# Patient Record
Sex: FEMALE | Race: WHITE
[De-identification: names, ages, dates, MRNs, and addresses within clinical notes are randomized per-mention and may not be internally consistent; named-entity substitution may affect disease eponyms.]

---

## 2017-11-15 ENCOUNTER — HOSPITAL ENCOUNTER (EMERGENCY)
Dept: HOSPITAL 60 - LB.ED | Age: 37
Discharge: HOME | End: 2017-11-15
Payer: MEDICAID

## 2017-11-15 DIAGNOSIS — Z91.040: ICD-10-CM

## 2017-11-15 DIAGNOSIS — Z79.899: ICD-10-CM
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
## 2017-11-15 DIAGNOSIS — Z88.5: ICD-10-CM

## 2017-11-15 DIAGNOSIS — M54.6: Primary | ICD-10-CM
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
## 2017-11-15 DIAGNOSIS — M54.5: ICD-10-CM

## 2017-11-15 PROCEDURE — 99283 EMERGENCY DEPT VISIT LOW MDM: CPT

## 2017-11-15 PROCEDURE — 84443 ASSAY THYROID STIM HORMONE: CPT

## 2017-11-15 PROCEDURE — 85025 COMPLETE CBC W/AUTO DIFF WBC: CPT

## 2017-11-15 PROCEDURE — 99282 EMERGENCY DEPT VISIT SF MDM: CPT

## 2017-11-15 PROCEDURE — 80053 COMPREHEN METABOLIC PANEL: CPT

## 2017-11-15 PROCEDURE — 36415 COLL VENOUS BLD VENIPUNCTURE: CPT

## 2017-11-15 NOTE — EDM.PDOC
ED HPI GENERAL MEDICAL PROBLEM





- General


Chief Complaint: General


Stated Complaint: back pain


Time Seen by Provider: 11/15/17 19:25


Source of Information: Reports: Patient, Family, RN


History Limitations: Reports: No Limitations





- History of Present Illness


INITIAL COMMENTS - FREE TEXT/NARRATIVE: 


37 yr female presents with upper back pain and numbness.  States she takes 

medication for low back pain.  Pt sitting with HOB elevated in no acute 

distress.  States she fell and hurt her upper back at home.





Onset: Today


  ** lower back


Pain Score (Numeric/FACES): 8





- Related Data


 Allergies











Allergy/AdvReac Type Severity Reaction Status Date / Time


 


latex Allergy  Itching Verified 11/15/17 19:35


 


morphine Allergy  Nausea and Verified 11/15/17 19:35





   Vomiting  











Home Meds: 


 Home Meds





Albuterol [Proventil HFA] 200 puff INH Q2H 11/15/17 [History]


Baclofen 10 mg PO TID 11/15/17 [History]


Cyanocobalamin (Vitamin B-12) [Vitamin B-12] 1,000 mcg PO DAILY 11/15/17 [

History]


DULoxetine [Cymbalta] 30 mg PO ACBREAKFAST 11/15/17 [History]


DULoxetine [Cymbalta] 60 mg PO BEDTIME 11/15/17 [History]


Ferrous Sulfate [Iron] 325 mg PO DAILY 11/15/17 [History]


Gabapentin [Neurontin] 900 mg PO QID 11/15/17 [History]


Mv-Min/Iron/Folic/Calcium/Vitk [Women's Daily Formula Tablet] 1 each PO DAILY 11

/15/17 [History]


hydrOXYzine HCl [Vistaril] 50 mg PO DAILY 11/15/17 [History]


oxyCODONE 5 mg PO ASDIRECTED PRN 11/15/17 [History]











ED ROS GENERAL





- Review of Systems


Review Of Systems: See Below


Constitutional: Reports: Weakness


HEENT: Reports: No Symptoms


Respiratory: Reports: No Symptoms


Cardiovascular: Reports: No Symptoms


GI/Abdominal: Reports: No Symptoms, Other (BM this am, hx gastric bypass)


: Reports: Other (hx hysterectomy)


Musculoskeletal: Reports: Back Pain, Leg Pain


Skin: Reports: No Symptoms


Neurological: Reports: Dizziness, Weakness





ED EXAM, GENERAL





- Physical Exam


Exam: See Below


Exam Limited By: No Limitations


General Appearance: Alert, No Apparent Distress


Nose: Normal Inspection


Throat/Mouth: Normal Lips


Head: Atraumatic, Normocephalic


Neck: Supple, Non-Tender


Respiratory/Chest: No Respiratory Distress, Lungs Clear


Cardiovascular: Regular Rate, Rhythm, No Edema


GI/Abdominal: Normal Bowel Sounds, Soft, Non-Tender


Back Exam: Paraspinal Tenderness


Extremities: Normal Inspection, No Pedal Edema


Neurological: Alert, Oriented


Psychiatric: Normal Affect, Normal Mood


Skin Exam: Warm, Dry, Normal Color


Lymphatic: No Adenopathy





Course





- Vital Signs


Last Recorded V/S: 


 Last Vital Signs











Temp  98.2 F   11/15/17 19:36


 


Pulse  93   11/15/17 19:36


 


Resp  20   11/15/17 19:36


 


BP  97/56 L  11/15/17 19:36


 


Pulse Ox  100   11/15/17 19:36














- Orders/Labs/Meds


Labs: 


 Laboratory Tests











  11/15/17 11/15/17 11/15/17 Range/Units





  19:35 19:35 19:35 


 


WBC   7.9  D   (4.0-11.0)  K/uL


 


RBC   3.92   (3.80-5.80)  M/uL


 


Hgb   8.9 L   (11.5-16.5)  g/dL


 


Hct   29.2 L   (37.0-47.0)  %


 


MCV   75 L   (76-96)  fL


 


MCH   22.7 L   (27.0-32.0)  pg


 


MCHC   30.5 L   (31.0-35.0)  g/dL


 


RDW   17.7 H   (11.0-16.0)  %


 


Plt Count   429   (150-500)  K/uL


 


MPV   8.9   (6.0-10.0)  fL


 


Neut % (Auto)   55.4   (45.0-70.0)  %


 


Lymph % (Auto)   33.4   (20.0-40.0)  %


 


Mono % (Auto)   8.6   (3.0-10.0)  %


 


Eos % (Auto)   2.3   (1.0-5.0)  %


 


Baso % (Auto)   0.3   (0.0-0.5)  %


 


Neut # (Auto)   4.38   (2.00-7.50)  K/uL


 


Lymph # (Auto)   2.64   (1.50-4.00)  K/uL


 


Mono # (Auto)   0.68   (0.20-0.80)  K/uL


 


Eos # (Auto)   0.18   (0.04-0.40)  K/uL


 


Baso # (Auto)   0.02   (0.02-0.10)  K/uL


 


Sodium    144  (136-145)  mmol/L


 


Potassium    3.9  (3.5-5.1)  mmol/L


 


Chloride    109 H  ()  mmol/L


 


Carbon Dioxide    25.6  (21.0-32.0)  mmol/L


 


Anion Gap    13.3  (5.0-15.0)  mmol/L


 


BUN    17  D  (8-26)  mg/dL


 


Creatinine    0.57  (0.55-1.02)  mg/dL


 


Est Cr Clr Drug Dosing    119.24  mL/min


 


Estimated GFR (MDRD)    > 60  (>60)  MLS/MIN


 


BUN/Creatinine Ratio    29.8 H  (6-25)  


 


Glucose    104 H  ()  mg/dL


 


Calcium    8.5  (8.5-10.1)  mg/dL


 


Total Bilirubin    0.1  D  (0.0-1.0)  mg/dL


 


AST    24  (15-37)  U/L


 


ALT    25  (12-78)  U/L


 


Alkaline Phosphatase    93  ()  U/L


 


Total Protein    6.8  (6.4-8.2)  g/dL


 


Albumin    3.4  (3.4-5.0)  g/dL


 


Globulin    3.4  (2.2-4.2)  g/dL


 


Albumin/Globulin Ratio    1.0  (0.8-2.0)  


 


TSH, Ultra Sensitive  0.548  D    (0.358-3.740)  uIU/mL














- Re-Assessments/Exams


Free Text/Narrative Re-Assessment/Exam: 





11/15/17 20:22  Review of lab results.  low hgb noted and reviewed with pt.  

Recommend rest, hydration, change position slowly, be aware of dizziness, use 

heat/ice to back for comfort.  Will give Toradol 30 mg IM for relief of pain.  

Pt to follow-up with PCP tomorrow for pain.  She has met with a neurosurgeon 

and is expecting spinal fusion.  She does have iron, B12, and folic acid and 

multi-vitamin at home.  Continue with these to build up blood before surgery.  





11/15/17 20:28  Results of MRI noted and reviewed with pt.  Degenerative disc 

disease noted.  D.ischarge to home, ambulatory








Departure





- Departure


Time of Disposition: 20:29


Disposition: Home, Self-Care 01


Condition: Good


Clinical Impression: 


 Pain








- Discharge Information


Referrals: 


PCP,None [Primary Care Provider] - 


Forms:  ED Department Discharge

## 2017-11-17 ENCOUNTER — HOSPITAL ENCOUNTER (EMERGENCY)
Dept: HOSPITAL 60 - LB.ED | Age: 37
Discharge: HOME | End: 2017-11-17
Payer: MEDICAID

## 2017-11-17 DIAGNOSIS — Z88.5: ICD-10-CM

## 2017-11-17 DIAGNOSIS — M47.816: Primary | ICD-10-CM

## 2017-11-17 DIAGNOSIS — Z88.8: ICD-10-CM

## 2017-11-17 DIAGNOSIS — Z79.899: ICD-10-CM

## 2017-11-17 PROCEDURE — 96374 THER/PROPH/DIAG INJ IV PUSH: CPT

## 2017-11-17 PROCEDURE — 96375 TX/PRO/DX INJ NEW DRUG ADDON: CPT

## 2017-11-17 PROCEDURE — 99283 EMERGENCY DEPT VISIT LOW MDM: CPT

## 2017-11-17 NOTE — EDM.PDOC
ED HPI GENERAL MEDICAL PROBLEM





- General


Chief Complaint: General


Stated Complaint: BACK PAIN


Time Seen by Provider: 17 19:20


Source of Information: Reports: Patient


History Limitations: Reports: No Limitations





- History of Present Illness


INITIAL COMMENTS - FREE TEXT/NARRATIVE: 


According to patient she claims that she has been having low back pain for past 

1 wk now. Pain is all over her lower back . She claims that she is numb from 

her waist all the way down to the right foot. She claims this has been going on 

for 1 wk. She is from around [place] and her PCP and her neuro surgeon are in 

[place]. She claims that she had incontinence of urine few weeks ago and she is 

not sure why., but now feels fine.She did get MRI done on 17 and was seen 

in the emergency room here for the same symptoms. She was given toradol and 

that i not helping. Her primary care provider has her on oxycodone 5mg 3-4 

times daily and it is not helping her.





She has a copy of her  Lumbar MRI and claims her MRI has been seen by 3 

neurosurgeons in [place] and they all have recommended surgery for her.





Duration: Week(s): (1wk )


Treatments PTA: Reports: Acetaminophen





- Related Data


 Allergies











Allergy/AdvReac Type Severity Reaction Status Date / Time


 


diphenhydramine Allergy  Seizure Verified 17 19:55





[From Benadryl]     


 


latex Allergy  Itching Verified 17 19:54


 


morphine Allergy  Nausea and Verified 17 19:54





   Vomiting  











Home Meds: 


 Home Meds





Albuterol [Proventil HFA] 200 puff INH Q2H 11/15/17 [History]


Baclofen 10 mg PO TID 11/15/17 [History]


Cyanocobalamin (Vitamin B-12) [Vitamin B-12] 1,000 mcg PO DAILY 11/15/17 [

History]


Ferrous Sulfate [Iron] 325 mg PO DAILY 11/15/17 [History]


Gabapentin [Neurontin] 900 mg PO QID 11/15/17 [History]


Mv-Min/Iron/Folic/Calcium/Vitk [Women's Daily Formula Tablet] 1 each PO DAILY 11

/15/17 [History]


hydrOXYzine HCl [Vistaril] 50 mg PO DAILY 11/15/17 [History]


oxyCODONE 5 mg PO ASDIRECTED PRN 11/15/17 [History]











Past Medical History


Respiratory History: Reports: Asthma


Gastrointestinal History: Reports: Bowel Obstruction


Musculoskeletal History: Reports: Back Pain, Chronic


Psychiatric History: Reports: Anxiety





- Past Surgical History


Respiratory Surgical History: Reports: None


GI Surgical History: Reports: Bariatric Procedure, Cholecystectomy


Female  Surgical History: Reports:  Section, Hysterectomy, 

Oophorectomy, Tubal Ligation


Musculoskeletal Surgical History: Reports: Other (See Below)


Other Musculoskeletal Surgeries/Procedures:: Screws in her (R) ankle and christi in 

her  (R) knee





ED ROS GENERAL





- Review of Systems


Review Of Systems: See Below


Constitutional: Denies: Fever, Chills


HEENT: Denies: Rhinitis, Sinus Problem, Throat Pain


Respiratory: Denies: Shortness of Breath, Wheezing, Cough, Sputum


Cardiovascular: Denies: Chest Pain, Lightheadedness


GI/Abdominal: Denies: Abdominal Pain, Nausea, Vomiting


: Denies: Dysuria, Flank Pain


Musculoskeletal: Reports: Back Pain.  Denies: Shoulder Pain, Joint Pain, Joint 

Swelling


Skin: Denies: Pruritis, Rash


Neurological: Denies: Confusion, Dizziness, Seizure, Difficulty Walking





ED EXAM, GENERAL





- Physical Exam


Exam: See Below


Exam Limited By: No Limitations


General Appearance: Alert, WD/WN, No Apparent Distress, Other (Pt is over-

reactive to pain)


Eye Exam: Bilateral Eye: EOMI, PERRL


Ears: Normal External Exam, Normal Canal, Hearing Grossly Normal, Normal TMs


Ear Exam: Bilateral Ear: Auricle Normal, Canal Normal, TM normal


Nose: Normal Inspection, Normal Mucosa, No Blood


Throat/Mouth: Normal Inspection, Normal Lips, Normal Teeth, Normal Gums, Normal 

Oropharynx, Normal Voice, No Airway Compromise


Head: Atraumatic, Normocephalic


Neck: Normal Inspection, Supple, Non-Tender, Full Range of Motion


Respiratory/Chest: No Respiratory Distress, Lungs Clear, Normal Breath Sounds, 

No Accessory Muscle Use, Chest Non-Tender


Cardiovascular: Normal Peripheral Pulses, Regular Rate, Rhythm, No Edema, No 

Gallop, No JVD, No Murmur, No Rub


Back Exam: Normal Inspection, Full Range of Motion, Paraspinal Tenderness (all 

over the lumbar region, exaggerated pain response even to touch.), Vertebral 

Tenderness (tender all over spinous).  No: CVA Tenderness (R), CVA Tenderness (L

)





Course





- Vital Signs


Text/Narrative:: 


Pt's MRi of the lumbar spine degenerative disc disease. there is neuronal 

foraminal  stenosis at different level. there  is no nerve entrapment or 

compression seen. But her neurosurgeon has told her that she has nerve 

compression( which might be true, per her neuro surgeon). On clinical exam she 

is able to stand up and walk. she has good strength in her great toe flexion 

and extension. hard to evaluate reflexes due to tense feeling of the lower 

extremity muscles. Pt claims she is on oxycodone and it is not helping.





She receive Dilaudid 1mgg and toradol 30mg IM. I have advised patient to 

alternate her oxycodone with tylenol, 650mg every 4 hrs. If she lorenzo develop 

difficulty walking, saddle numbness or incontinence of urine or stool needs to 

come back to emergency room.





Other wise advised to followup with her primary care physician and her 

neurosurgeon on monday for futher care of her bak pain.





Pt understand and agrees to the plan.





Last Recorded V/S: 





 Last Vital Signs











Temp  98.1 F   17 19:50


 


Pulse  97   17 19:50


 


Resp  12   17 19:50


 


BP  122/71   17 19:50


 


Pulse Ox  100   17 19:50














- Orders/Labs/Meds


Orders: 





 Active Orders 24 hr











 Category Date Time Status


 


 HYDROmorphone [Dilaudid] Med  17 20:14 Active





 1 mg IVPUSH Q2H PRN   








 Medication Orders





Hydromorphone HCl (Dilaudid)  1 mg IVPUSH Q2H PRN


   PRN Reason: PAIN


   Last Admin: 17 20:23  Dose: 1 mg








Meds: 





Medications











Generic Name Dose Route Start Last Admin





  Trade Name Freq  PRN Reason Stop Dose Admin


 


Hydromorphone HCl  1 mg  17 20:14  17 20:23





  Dilaudid  IVPUSH   1 mg





  Q2H PRN   Administration





  PAIN   














Discontinued Medications














Generic Name Dose Route Start Last Admin





  Trade Name Freq  PRN Reason Stop Dose Admin


 


Ketorolac Tromethamine  30 mg  17 20:15  17 20:23





  Toradol  IVPUSH  17 20:16  30 mg





  ONETIME ONE   Administration














Departure





- Departure


Time of Disposition: 19:30


Disposition: Home, Self-Care 01


Condition: Fair


Clinical Impression: 


 Degenerative arthritis of lumbar spine








- Discharge Information


Instructions:  Back Pain, Adult


Forms:  ED Department Discharge


Additional Instructions: 


Follow up with your primary care provider and neurosurgeon on Monday. Alternate 

Tylenol 650 mg and oxycodone every 4 hours as needed for pain. Use intermittent 

heat to the back.  If you being to have incontinence of stool or urine return 

to the emergency room immediately. 





- Problem List & Annotations


(1) Degenerative arthritis of lumbar spine


Status: Acute   Current Visit: Yes   





- Problem List Review


Problem List Initiated/Reviewed/Updated: Yes





- My Orders


Last 24 Hours: 





My Active Orders





17 20:14


HYDROmorphone [Dilaudid]   1 mg IVPUSH Q2H PRN 














- Assessment/Plan


Last 24 Hours: 





My Active Orders





17 20:14


HYDROmorphone [Dilaudid]   1 mg IVPUSH Q2H PRN 











Assessment:: 


Lumbar DSD





Plan: 


Pt's MRi of the lumbar spine degenerative disc disease. there is neuronal 

foraminal  stenosis at different level. there  is no nerve entrapment or 

compression seen. But her neurosurgeon has told her that she has nerve 

compression( which might be true, per her neuro surgeon). On clinical exam she 

is able to stand up and walk. she has good strength in her great toe flexion 

and extension. hard to evaluate reflexes due to tense feeling of the lower 

extremity muscles. Pt claims she is on oxycodone and it is not helping.





She receive Dilaudid 1mgg and toradol 30mg IM. I have advised patient to 

alternate her oxycodone with tylenol, 650mg every 4 hrs. If she lorenzo develop 

difficulty walking, saddle numbness or incontinence of urine or stool needs to 

come back to emergency room.





Other wise advised to followup with her primary care physician and her 

neurosurgeon on monday for futher care of her bak pain.





Pt understand and agrees to the plan.

## 2017-12-29 ENCOUNTER — HOSPITAL ENCOUNTER (EMERGENCY)
Dept: HOSPITAL 60 - LB.ED | Age: 37
Discharge: HOME | End: 2017-12-29
Payer: MEDICAID

## 2017-12-29 DIAGNOSIS — R07.81: Primary | ICD-10-CM

## 2017-12-29 DIAGNOSIS — D64.9: ICD-10-CM

## 2017-12-29 DIAGNOSIS — Z98.84: ICD-10-CM

## 2017-12-29 PROCEDURE — 99285 EMERGENCY DEPT VISIT HI MDM: CPT

## 2017-12-29 PROCEDURE — 80053 COMPREHEN METABOLIC PANEL: CPT

## 2017-12-29 PROCEDURE — 93005 ELECTROCARDIOGRAM TRACING: CPT

## 2017-12-29 PROCEDURE — 82150 ASSAY OF AMYLASE: CPT

## 2017-12-29 PROCEDURE — 85025 COMPLETE CBC W/AUTO DIFF WBC: CPT

## 2017-12-29 PROCEDURE — 84484 ASSAY OF TROPONIN QUANT: CPT

## 2017-12-29 PROCEDURE — 83690 ASSAY OF LIPASE: CPT

## 2017-12-29 PROCEDURE — 96374 THER/PROPH/DIAG INJ IV PUSH: CPT

## 2017-12-29 PROCEDURE — 71260 CT THORAX DX C+: CPT

## 2017-12-29 NOTE — ER
HISTORY OF PRESENT ILLNESS:  37-year-old female here with her  with complaints of

chest pain from the midsternal area that seems to radiate back between the shoulder blades.

The patient states it started yesterday.  It seems to be slowly getting worse.  She has not

been coughing.  She denies any falls or injuries.  She has not been sick.  She rates the

pain at 7 or 8/10.  She states it feels the best when she sits up and holds her knees

against her chest with her arms.  The patient does have history of fibromyalgia.  She is

also status post gastric bypass and has had some issues with being anemic.

 

OBJECTIVE:  GENERAL APPEARANCE:  The patient is awake and alert, in no obvious distress.

VITAL SIGNS:  Reviewed.  They are normal.  HEENT:  Oral mucous membranes are moist.  Tonsils

not enlarged or injected.  Pharynx not inflamed.  NECK:  Supple.  LUNGS:  Clear.  Deep

breathing does cause some mild chest discomfort, both anterior and posteriorly.  I can

reproduce pain pushing on the posterior chest wall between the shoulder blades as well.

CARDIAC:  Heart sounds distinct.  S1, S2 present.  Regular rate.  No murmurs.  SKIN:  Warm

and dry.

 

LABORATORY DATA AND X-RAY:  CBC shows a hemoglobin of 8.5, which is just slightly less than

her most recent hemoglobin.  The patient states she has not been doing very good at taking

her supplemental iron tablet.  CMP is unremarkable.  Troponin is normal.  EKG is obtained

and is normal.  Chest CT is negative.

 

DIAGNOSES:

1. Pleuritic chest pain.

2. Secondary diagnosis, anemia, which I believe is chronic in nature.

 

TREATMENT PLAN:  The patient was given Dilaudid 1 mg IV here in the emergency room and I

brought her pain down to about a 4/10.  She is comfortable.  We will give her a few Norco

tablets to take as needed.  I do want the patient to follow up in the clinic early next week

for recheck.  She states she really does not have a primary care provider.  I advised

patient to establish care with a primary care provider.  She does see a neurologist in

[place], Dr. Lopez for other issues and I want the patient to increase her ferrous

sulfate to 2 or 3 times a day at this point.  The patient has no further questions.

 

 

CRS/MODL

DD:  12/29/2017 08:57:51

DT:  12/29/2017 09:19:21

Job #:  [id number]

## 2018-01-01 NOTE — CT
DATE OF SERVICE:  12/29/2017  

CLINICAL DATA:  Chest pain.



ENHANCED CHEST CT:



Multislice acquisition through the chest with IV contrast was performed.  



No priors.  



No evidence of PE.  No pneumothorax.  No pleural effusions.  No aortic aneurysm 
or dissection.  



The lungs are clear.  



The heart size is normal.  No significant pericardial effusion.  No hilar or 
mediastinal adenopathy.  



The patient is status post prior cholecystectomy.  There are also surgical 
changes involving the stomach.  



IMPRESSION:  No acute abnormalities.  



[id number]
[facility]

## 2018-08-31 ENCOUNTER — HOSPITAL ENCOUNTER (EMERGENCY)
Dept: HOSPITAL 60 - LB.ED | Age: 38
Discharge: HOME | End: 2018-08-31
Payer: MEDICAID

## 2018-08-31 DIAGNOSIS — Z88.8: ICD-10-CM

## 2018-08-31 DIAGNOSIS — Z79.899: ICD-10-CM

## 2018-08-31 DIAGNOSIS — Z88.6: ICD-10-CM

## 2018-08-31 DIAGNOSIS — E16.2: Primary | ICD-10-CM

## 2018-08-31 DIAGNOSIS — Z88.5: ICD-10-CM

## 2018-08-31 DIAGNOSIS — D50.8: ICD-10-CM

## 2018-08-31 DIAGNOSIS — Z91.040: ICD-10-CM

## 2018-08-31 NOTE — CT
DATE OF SERVICE:  08/31/18

CLINICAL DATA:  syncope



UNENHANCED BRAIN CT:



Multislice acquisition through the brain without IV contrast was performed. 



Comparison is made to a prior exam dated 10/31/17.



No masses or mass effect.  No intracranial hemorrhage.  No evidence of acute or 
subdural infarct.  



No changes from the prior study. 



IMPRESSION:  No acute intracranial abnormalities. 



[id number]
[facility]

## 2018-08-31 NOTE — EDM.PDOC
ED HPI GENERAL MEDICAL PROBLEM





- General


Stated Complaint: PASSED OUT


Time Seen by Provider: 18 12:55


Source of Information: Reports: Patient


History Limitations: Reports: No Limitations





- History of Present Illness


INITIAL COMMENTS - FREE TEXT/NARRATIVE: 





This is a 37yo who passed out today about 1045 for 15-20s per the significant 

other. She has passed out in the past with prior diagnoses of anemia and 

hypoglycemia. She has had prior workup and does see a neurologist. Patient 

denies current complaints but does feel nauseated. She states she has felt this 

way since yesterday morning. She has been working a lot more and had breakfast 

at 11 am after her episode and did not have anything to eat since last night.  


Onset: Sudden


Duration: Resolved Prior to Arrival


Location: Reports: Generalized





- Related Data


 Allergies











Allergy/AdvReac Type Severity Reaction Status Date / Time


 


diphenhydramine Allergy  Seizure Verified 18 14:03





[From Benadryl]     


 


ibuprofen Allergy  Vomiting Verified 18 14:03


 


latex Allergy  Itching Verified 18 14:03


 


morphine Allergy  Nausea and Verified 18 14:03





   Vomiting  











Home Meds: 


 Home Meds





Albuterol [Proventil HFA] 200 puff INH Q2H PRN 11/15/17 [History]


Baclofen 10 mg PO TID 11/15/17 [History]


Cyanocobalamin (Vitamin B-12) [Vitamin B-12] 1,000 mcg PO DAILY 11/15/17 [

History]


Ferrous Sulfate [Iron] 325 mg PO DAILY 11/15/17 [History]


Gabapentin [Neurontin] 900 mg PO QID 11/15/17 [History]


Mv-Min/Iron/Folic/Calcium/Vitk [Women's Daily Formula Tablet] 1 each PO DAILY 11

/15/17 [History]


hydrOXYzine HCl [Vistaril] 50 mg PO DAILY 11/15/17 [History]











Past Medical History


Respiratory History: Reports: Asthma


Gastrointestinal History: Reports: Bowel Obstruction


OB/GYN History: Reports: Endometriosis, Pregnancy


Musculoskeletal History: Reports: Back Pain, Chronic


Psychiatric History: Reports: Anxiety


Hematologic History: Reports: Anemia, B12 Deficiency, Blood Transfusion(s)





- Past Surgical History


Respiratory Surgical History: Reports: None


GI Surgical History: Reports: Bariatric Procedure, Cholecystectomy, Other (See 

Below)


Other GI Surgeries/Procedures: surgery to fix bowel obstruction


Female  Surgical History: Reports:  Section, Hysterectomy, 

Oophorectomy, Tubal Ligation


Musculoskeletal Surgical History: Reports: Other (See Below)


Other Musculoskeletal Surgeries/Procedures:: Screws in her (R) ankle and christi in 

her  (R) knee





ED ROS GENERAL





- Review of Systems


Review Of Systems: ROS reveals no pertinent complaints other than HPI.





- Physical Exam


Exam: See Below


Exam Limited By: No Limitations


General Appearance: Alert, WD/WN, No Apparent Distress


Eye Exam: Bilateral Eye: EOMI, PERRL


Ears: Normal External Exam


Nose: Normal Inspection


Throat/Mouth: Normal Inspection


Head Exam: Atraumatic, Normocephalic


Neck: Normal Inspection


Respiratory/Chest: No Respiratory Distress, Lungs Clear, Normal Breath Sounds


Cardiovascular: Normal Peripheral Pulses, Regular Rate, Rhythm


GI/Abdominal: Normal Bowel Sounds





Course





- Orders/Labs/Meds


Orders: 


 Active Orders 24 hr











 Category Date Time Status


 


 EKG Documentation Completion [RC] ASDIRECTED Care  18 13:14 Ordered


 


 Head wo Cont [CT] Stat Exams  18 13:14 Ordered











Labs: 


 Laboratory Tests











  18 Range/Units





  13:15 13:15 14:12 


 


WBC  6.9  D    (4.0-11.0)  K/uL


 


RBC  4.11    (3.80-5.80)  M/uL


 


Hgb  9.1 L    (11.5-16.5)  g/dL


 


Hct  30.3 L    (37.0-47.0)  %


 


MCV  74 L    (76-96)  fL


 


MCH  22.1 L    (27.0-32.0)  pg


 


MCHC  30.0 L    (31.0-35.0)  g/dL


 


RDW  18.9 H    (11.0-16.0)  %


 


Plt Count  341    (150-500)  K/uL


 


MPV  8.7    (6.0-10.0)  fL


 


Neut % (Auto)  62.2    (45.0-70.0)  %


 


Lymph % (Auto)  26.3    (20.0-40.0)  %


 


Mono % (Auto)  8.5    (3.0-10.0)  %


 


Eos % (Auto)  2.6    (1.0-5.0)  %


 


Baso % (Auto)  0.4    (0.0-0.5)  %


 


Neut # (Auto)  4.30    (2.00-7.50)  K/uL


 


Lymph # (Auto)  1.82    (1.50-4.00)  K/uL


 


Mono # (Auto)  0.59    (0.20-0.80)  K/uL


 


Eos # (Auto)  0.18    (0.04-0.40)  K/uL


 


Baso # (Auto)  0.03    (0.02-0.10)  K/uL


 


Sodium   140   (136-145)  mmol/L


 


Potassium   3.9   (3.5-5.1)  mmol/L


 


Chloride   109 H   ()  mmol/L


 


Carbon Dioxide   22.0   (21.0-32.0)  mmol/L


 


Anion Gap   12.9   (5.0-15.0)  mmol/L


 


BUN   10  D   (8-26)  mg/dL


 


Creatinine   0.63   (0.55-1.02)  mg/dL


 


Est Cr Clr Drug Dosing   TNP   


 


Estimated GFR (MDRD)   > 60   (>60)  MLS/MIN


 


BUN/Creatinine Ratio   15.9   (6-25)  


 


Glucose   91   ()  mg/dL


 


Calcium   7.7 L   (8.5-10.1)  mg/dL


 


Total Bilirubin   0.2   (0.0-1.0)  mg/dL


 


AST   24   (15-37)  U/L


 


ALT   32   (12-78)  U/L


 


Alkaline Phosphatase   89   ()  U/L


 


Troponin I   < 0.017   (0.000-0.060)  ng/mL


 


Total Protein   6.1 L   (6.4-8.2)  g/dL


 


Albumin   3.0 L   (3.4-5.0)  g/dL


 


Globulin   3.1   (2.2-4.2)  g/dL


 


Albumin/Globulin Ratio   1.0   (0.8-2.0)  


 


TSH, Ultra Sensitive   0.597   (0.358-3.740)  uIU/mL


 


Urine Color    Yellow  


 


Urine Appearance    Clear  (CLEAR)  


 


Urine pH    6.5  (5.0-8.0)  


 


Ur Specific Gravity    1.020  (1.003-1.030)  


 


Urine Protein    Negative  (NEGATIVE)  mg/dL


 


Urine Glucose (UA)    Negative  (NEGATIVE)  mg/dL


 


Urine Ketones    Negative  (NEGATIVE)  mg/dL


 


Urine Occult Blood    Negative  (NEGATIVE)  


 


Urine Nitrite    Negative  (NEGATIVE)  


 


Urine Bilirubin    Negative  (NEGATIVE)  


 


Urine Urobilinogen    0.2  (0.2-1.0)  E.U./dL


 


Ur Leukocyte Esterase    Negative  (NEGATIVE)  


 


Urine RBC    Not seen  /HPF


 


Urine WBC    Not seen  /HPF


 


Ur Squamous Epith Cells    Moderate  /HPF


 


Urine Bacteria    Rare  /HPF














Departure





- Departure


Time of Disposition: 14:30


Disposition: Home, Self-Care 01


Condition: Good


Clinical Impression: 


 Hypoglycemia





Anemia


Qualifiers:


 Anemia type: iron deficiency Iron deficiency anemia type: other iron 

deficiency Qualified Code(s): D50.8 - Other iron deficiency anemias








- Discharge Information


Instructions:  Anemia


Referrals: 


PCP,None [Primary Care Provider] - 


Forms:  ED Department Discharge


Additional Instructions: 


Wear holter monitor for 2 weeks then mail back in box it came in. Continue 

taking vitamins and iron as routinely done and ordered since gastric by pass 

surgery. Return to clinic or ER if any other c/o.


Care Plan Goals: 








ED HPI GENERAL MEDICAL PROBLEM





- General


Stated Complaint: PASSED OUT


Time Seen by Provider: 18 12:55


Source of Information: Reports: Patient


History Limitations: Reports: No Limitations





- History of Present Illness


INITIAL COMMENTS - FREE TEXT/NARRATIVE: 





This is a 37yo who passed out today about 1045 for 15-20s per the significant 

other. She has passed out in the past with prior diagnoses of anemia and 

hypoglycemia. She has had prior workup and does see a neurologist. Patient 

denies current complaints but does feel nauseated. She states she has felt this 

way since yesterday morning. She has been working a lot more and had breakfast 

at 11 am after her episode and did not have anything to eat since last night.  


Onset: Sudden


Duration: Resolved Prior to Arrival


Location: Reports: Generalized





- Related Data


 Allergies











Allergy/AdvReac Type Severity Reaction Status Date / Time


 


diphenhydramine Allergy  Seizure Verified 18 14:03





[From Benadryl]     


 


ibuprofen Allergy  Vomiting Verified 18 14:03


 


latex Allergy  Itching Verified 18 14:03


 


morphine Allergy  Nausea and Verified 18 14:03





   Vomiting  











Home Meds: 


 Home Meds





Albuterol [Proventil HFA] 200 puff INH Q2H PRN 11/15/17 [History]


Baclofen 10 mg PO TID 11/15/17 [History]


Cyanocobalamin (Vitamin B-12) [Vitamin B-12] 1,000 mcg PO DAILY 11/15/17 [

History]


Ferrous Sulfate [Iron] 325 mg PO DAILY 11/15/17 [History]


Gabapentin [Neurontin] 900 mg PO QID 11/15/17 [History]


Mv-Min/Iron/Folic/Calcium/Vitk [Women's Daily Formula Tablet] 1 each PO DAILY 11

/15/17 [History]


hydrOXYzine HCl [Vistaril] 50 mg PO DAILY 11/15/17 [History]











Past Medical History


Respiratory History: Reports: Asthma


Gastrointestinal History: Reports: Bowel Obstruction


OB/GYN History: Reports: Endometriosis, Pregnancy


Musculoskeletal History: Reports: Back Pain, Chronic


Psychiatric History: Reports: Anxiety


Hematologic History: Reports: Anemia, B12 Deficiency, Blood Transfusion(s)





- Past Surgical History


Respiratory Surgical History: Reports: None


GI Surgical History: Reports: Bariatric Procedure, Cholecystectomy, Other (See 

Below)


Other GI Surgeries/Procedures: surgery to fix bowel obstruction


Female  Surgical History: Reports:  Section, Hysterectomy, 

Oophorectomy, Tubal Ligation


Musculoskeletal Surgical History: Reports: Other (See Below)


Other Musculoskeletal Surgeries/Procedures:: Screws in her (R) ankle and christi in 

her  (R) knee





ED ROS GENERAL





- Review of Systems


Review Of Systems: ROS reveals no pertinent complaints other than HPI.





- Physical Exam


Exam: See Below


Exam Limited By: No Limitations


General Appearance: Alert, WD/WN, No Apparent Distress


Eye Exam: Bilateral Eye: EOMI, PERRL


Ears: Normal External Exam


Nose: Normal Inspection


Throat/Mouth: Normal Inspection


Head Exam: Atraumatic, Normocephalic


Neck: Normal Inspection


Respiratory/Chest: No Respiratory Distress, Lungs Clear, Normal Breath Sounds


Cardiovascular: Normal Peripheral Pulses, Regular Rate, Rhythm


GI/Abdominal: Normal Bowel Sounds





Course





- Orders/Labs/Meds


Orders: 


 Active Orders 24 hr











 Category Date Time Status


 


 EKG Documentation Completion [RC] ASDIRECTED Care  18 13:14 Ordered


 


 Head wo Cont [CT] Stat Exams  18 13:14 Ordered











Labs: 


 Laboratory Tests











  18 Range/Units





  13:15 13:15 14:12 


 


WBC  6.9  D    (4.0-11.0)  K/uL


 


RBC  4.11    (3.80-5.80)  M/uL


 


Hgb  9.1 L    (11.5-16.5)  g/dL


 


Hct  30.3 L    (37.0-47.0)  %


 


MCV  74 L    (76-96)  fL


 


MCH  22.1 L    (27.0-32.0)  pg


 


MCHC  30.0 L    (31.0-35.0)  g/dL


 


RDW  18.9 H    (11.0-16.0)  %


 


Plt Count  341    (150-500)  K/uL


 


MPV  8.7    (6.0-10.0)  fL


 


Neut % (Auto)  62.2    (45.0-70.0)  %


 


Lymph % (Auto)  26.3    (20.0-40.0)  %


 


Mono % (Auto)  8.5    (3.0-10.0)  %


 


Eos % (Auto)  2.6    (1.0-5.0)  %


 


Baso % (Auto)  0.4    (0.0-0.5)  %


 


Neut # (Auto)  4.30    (2.00-7.50)  K/uL


 


Lymph # (Auto)  1.82    (1.50-4.00)  K/uL


 


Mono # (Auto)  0.59    (0.20-0.80)  K/uL


 


Eos # (Auto)  0.18    (0.04-0.40)  K/uL


 


Baso # (Auto)  0.03    (0.02-0.10)  K/uL


 


Sodium   140   (136-145)  mmol/L


 


Potassium   3.9   (3.5-5.1)  mmol/L


 


Chloride   109 H   ()  mmol/L


 


Carbon Dioxide   22.0   (21.0-32.0)  mmol/L


 


Anion Gap   12.9   (5.0-15.0)  mmol/L


 


BUN   10  D   (8-26)  mg/dL


 


Creatinine   0.63   (0.55-1.02)  mg/dL


 


Est Cr Clr Drug Dosing   TNP   


 


Estimated GFR (MDRD)   > 60   (>60)  MLS/MIN


 


BUN/Creatinine Ratio   15.9   (6-25)  


 


Glucose   91   ()  mg/dL


 


Calcium   7.7 L   (8.5-10.1)  mg/dL


 


Total Bilirubin   0.2   (0.0-1.0)  mg/dL


 


AST   24   (15-37)  U/L


 


ALT   32   (12-78)  U/L


 


Alkaline Phosphatase   89   ()  U/L


 


Troponin I   < 0.017   (0.000-0.060)  ng/mL


 


Total Protein   6.1 L   (6.4-8.2)  g/dL


 


Albumin   3.0 L   (3.4-5.0)  g/dL


 


Globulin   3.1   (2.2-4.2)  g/dL


 


Albumin/Globulin Ratio   1.0   (0.8-2.0)  


 


TSH, Ultra Sensitive   0.597   (0.358-3.740)  uIU/mL


 


Urine Color    Yellow  


 


Urine Appearance    Clear  (CLEAR)  


 


Urine pH    6.5  (5.0-8.0)  


 


Ur Specific Gravity    1.020  (1.003-1.030)  


 


Urine Protein    Negative  (NEGATIVE)  mg/dL


 


Urine Glucose (UA)    Negative  (NEGATIVE)  mg/dL


 


Urine Ketones    Negative  (NEGATIVE)  mg/dL


 


Urine Occult Blood    Negative  (NEGATIVE)  


 


Urine Nitrite    Negative  (NEGATIVE)  


 


Urine Bilirubin    Negative  (NEGATIVE)  


 


Urine Urobilinogen    0.2  (0.2-1.0)  E.U./dL


 


Ur Leukocyte Esterase    Negative  (NEGATIVE)  


 


Urine RBC    Not seen  /HPF


 


Urine WBC    Not seen  /HPF


 


Ur Squamous Epith Cells    Moderate  /HPF


 


Urine Bacteria    Rare  /HPF














Departure





- Departure


Time of Disposition: 14:30


Disposition: Home, Self-Care 01


Condition: Good


Clinical Impression: 


 Hypoglycemia





Anemia


Qualifiers:


 Anemia type: iron deficiency Iron deficiency anemia type: other iron 

deficiency Qualified Code(s): D50.8 - Other iron deficiency anemias








- Discharge Information


Instructions:  Anemia


Referrals: 


PCP,None [Primary Care Provider] - 


Forms:  ED Department Discharge


Additional Instructions: 


Wear holter monitor for 2 weeks then mail back in box it came in. Continue 

taking vitamins and iron as routinely done and ordered since gastric by pass 

surgery. Return to clinic or ER if any other c/o.





- My Orders


Last 24 Hours: 


My Active Orders





18 13:14


EKG Documentation Completion [RC] ASDIRECTED 


Head wo Cont [CT] Stat 














- Assessment/Plan


Last 24 Hours: 


My Active Orders





18 13:14


EKG Documentation Completion [RC] ASDIRECTED 


Head wo Cont [CT] Stat 











Counseled on close monitoring and f/u. Discussed f/u with Neurology for further 

workup. Patient did have a aba





- Problem List & Annotations


(1) Hypoglycemia


SNOMED Code(s): 980277290


   Code(s): E16.2 - HYPOGLYCEMIA, UNSPECIFIED   Status: Acute   





(2) Anemia


SNOMED Code(s): 455592601


   Code(s): D64.9 - ANEMIA, UNSPECIFIED   Status: Chronic   Onset Date: ~   Annotation/Comment:: 17 - 1. Pleuritic chest pain, 2. Secondary 

diagnosis, anemia, which I believe is chronic in nature.   


Qualifiers: 


   Anemia type: iron deficiency   Iron deficiency anemia type: other iron 

deficiency   Qualified Code(s): D50.8 - Other iron deficiency anemias   





- Problem List Review


Problem List Initiated/Reviewed/Updated: Yes





- My Orders


Last 24 Hours: 


My Active Orders





18 13:14


EKG Documentation Completion [RC] ASDIRECTED 


Head wo Cont [CT] Stat 














- Assessment/Plan


Last 24 Hours: 


My Active Orders





18 13:14


EKG Documentation Completion [RC] ASDIRECTED 


Head wo Cont [CT] Stat 











Plan: 





Counseled on close f/u and monitoring of symptoms. Discussed f/u with Neurology 

and continued monitoring of hypoglycemia and supplementation with iron. F/u 

with PCP in the next few days.